# Patient Record
Sex: MALE
[De-identification: names, ages, dates, MRNs, and addresses within clinical notes are randomized per-mention and may not be internally consistent; named-entity substitution may affect disease eponyms.]

---

## 2023-08-30 PROBLEM — Z00.00 ENCOUNTER FOR PREVENTIVE HEALTH EXAMINATION: Status: ACTIVE | Noted: 2023-08-30

## 2023-09-05 ENCOUNTER — APPOINTMENT (OUTPATIENT)
Dept: ORTHOPEDIC SURGERY | Facility: CLINIC | Age: 28
End: 2023-09-05
Payer: OTHER MISCELLANEOUS

## 2023-09-05 VITALS — WEIGHT: 200 LBS | BODY MASS INDEX: 26.51 KG/M2 | HEIGHT: 73 IN

## 2023-09-05 DIAGNOSIS — Z87.891 PERSONAL HISTORY OF NICOTINE DEPENDENCE: ICD-10-CM

## 2023-09-05 PROCEDURE — 72100 X-RAY EXAM L-S SPINE 2/3 VWS: CPT

## 2023-09-05 PROCEDURE — 99204 OFFICE O/P NEW MOD 45 MIN: CPT

## 2023-09-05 PROCEDURE — 72070 X-RAY EXAM THORAC SPINE 2VWS: CPT

## 2023-09-05 RX ORDER — IBUPROFEN 800 MG/1
800 TABLET, FILM COATED ORAL
Qty: 90 | Refills: 0 | Status: ACTIVE | COMMUNITY
Start: 2023-09-05 | End: 1900-01-01

## 2023-09-05 NOTE — DISCUSSION/SUMMARY
[Medication Risks Reviewed] : Medication risks reviewed [de-identified] : He will rest and use moist heat.  He has been started on ibuprofen 800 mg 3 times a day as a nonsteroidal anti-inflammatory.  He will call if there are problems with the medication or worsening of his symptoms and I will see him for follow-up in 3 weeks.

## 2023-09-05 NOTE — HISTORY OF PRESENT ILLNESS
[de-identified] : This 27-year-old male who works as a  slipped and fell on stairs at worst on August 17.  He had some mid back and upper lumbar pain.  Initially it radiated anteriorly but that has resolved.  He has not had leg pain or associated neurologic symptoms.  He initially saw a chiropractor who is manipulating his spine.  The pain that was initially a 6 or 7 throughout the day is now worse and the need for a few minutes in the morning but only a discomfort later in the day.  The pain is slightly worse coughing and sneezing but no worse forcing to move his bowels.  He is not having night pain.  He did not have constipation after the fall as would frequently be seen as a result of an ileus associated with a compression fracture.  His past medical history and review of systems are negative. [Pain Location] : pain [2] : a minimum pain level of 2/10 [8] : a maximum pain level of 8/10

## 2023-09-05 NOTE — PHYSICAL EXAM
[de-identified] : He ambulates with a normal gait including tiptoe and heel walking.  Cutaneous examination of the spine shows no faded bruising.  There are acne scars in the upper back.  There are no palpable bony defects of the spine.  There is no evidence of shortness of breath or respiratory distress.  There is no evidence of unsteadiness or spasticity.  On stance evaluation there is a mild elevation of the right shoulder with a level pelvis.  There is no paravertebral muscle spasm, sciatic notch tenderness or trochanteric tenderness.  There is no tenderness of the spine.  Forward flexion of the spine today is painless.  His lower extremity neurological examination revealed 1+ symmetrical reflexes.  Motor power is normal to manual testing all lower extremity groups and sensation is normal to light touch in all dermatomes.  Straight leg raising is negative to 90 degrees in the sitting position bilaterally.  His hips and his knees have a full and painless range of motion with normal stability.  Vascular examination shows no evidence of varicosities and there is no lymphedema.  There are no cutaneous abnormalities of the upper or lower extremities. [de-identified] : AP and lateral x-rays of the thoracic and lumbar spine are obtained.  Sagittal alignment is normal.  Disc heights are well-maintained.  There is no evidence of any destructive change or traumatic injury.

## 2023-09-26 ENCOUNTER — APPOINTMENT (OUTPATIENT)
Dept: ORTHOPEDIC SURGERY | Facility: CLINIC | Age: 28
End: 2023-09-26
Payer: OTHER MISCELLANEOUS

## 2023-09-26 VITALS — HEIGHT: 74 IN | BODY MASS INDEX: 25.67 KG/M2 | WEIGHT: 200 LBS

## 2023-09-26 DIAGNOSIS — M54.9 DORSALGIA, UNSPECIFIED: ICD-10-CM

## 2023-09-26 PROCEDURE — 99213 OFFICE O/P EST LOW 20 MIN: CPT
